# Patient Record
(demographics unavailable — no encounter records)

---

## 2017-08-02 NOTE — EMERGENCY ROOM REPORT
History of Present Illness


General


Chief Complaint:  Animal Bite


Source:  Patient





Present Illness


HPI


The patient is a 34-year-old female presenting with right arm rash and pain.  

She was seen in this emergency department earlier this morning for the same 

complaints.  She states that the pain and the redness of worsen.  It is now a 9/

10 dull ache and does not radiate from the right arm.  Worse with touch.  She 

denies other symptoms including fever or chills


Allergies:  


Coded Allergies:  


     No Known Allergies (Unverified , 4/10/13)





Patient History


Past Medical History:  see triage record


Pertinent Family History:  none


Last Menstrual Period:  Unk


Reviewed Nursing Documentation:  PMH: Agreed, PSxH: Agreed





Nursing Documentation-PMH


Past Medical History:  No Stated History





Review of Systems


All Other Systems:  negative except mentioned in HPI





Physical Exam





Vital Signs








  Date Time  Temp Pulse Resp B/P Pulse Ox O2 Delivery O2 Flow Rate FiO2


 


8/2/17 12:52 98.4 96 18 110/28 99 Room Air  








Sp02 EP Interpretation:  reviewed, normal


General Appearance:  no apparent distress, alert, GCS 15, non-toxic


Head:  normocephalic, atraumatic


ENT:  hearing grossly normal, normal pharynx, no angioedema, normal voice


Neck:  full range of motion, supple/symm/no masses


Musculoskeletal:  back normal, gait/station normal, normal range of motion


Neurologic:  alert, oriented x3, responsive, motor strength/tone normal, 

sensory intact, speech normal


Psychiatric:  judgement/insight normal, memory normal, mood/affect normal, no 

suicidal/homicidal ideation


Skin:  rash - Diffuse erythema to the right anterior proximal arm with central 

small punctures.


Lymphatic:  no adenopathy





Medical Decision Making


PA Attestation


Dr. Cuba is my supervising physician. Patient management was discussed with 

my supervising physician


Diagnostic Impression:  


 Primary Impression:  


 Cellulitis


 Qualified Codes:  L03.113 - Cellulitis of right upper limb


ER Course


The patient is a 34-year-old female presenting with a rash.





Ddx considered include but not limited to insect bite, contact dermatitis, 

eczema, cellulitis





Physical exam: Afebrile.


Right proximal anterior arm: There is erythema, tenderness to palpation, and 

increased temperature to the skin.





The patient will be treated for cellulitis





ER precautions given





Last Vital Signs








  Date Time  Temp Pulse Resp B/P Pulse Ox O2 Delivery O2 Flow Rate FiO2


 


8/2/17 12:52 98.4 96 18 110/28 99 Room Air  








Status:  improved


Disposition:  HOME, SELF-CARE


Condition:  Improved


Scripts


Doxycycline Hyclate* (VIBRAMYCIN*) 100 Mg Capsule


100 MG ORAL EVERY 12 HOURS, #14 CAP 0 Refills


   Prov: CORRY GARCIA         8/2/17


Referrals:  


[facility] (PCP)


Patient Instructions:  Cellulitis





Additional Instructions:  


I discussed my findings with the patient. All questions and concerns have been 

answered. Treatment and medication compliance have been addressed. I advised 

the patient that they need to follow up with PMD in 3-5 days. Return to ED if 

symptoms worsen, new symptoms arise, or if needed for any reason. Patient 

verbalized understanding of discharge instructions.











CORRY GARCIA Aug 2, 2017 22:08

## 2017-08-02 NOTE — EMERGENCY ROOM REPORT
History of Present Illness


General


Chief Complaint:  Animal Bite


Source:  Patient





Present Illness


HPI


Patient states that she notes some insect bites on her right arm and the back 

of her right shoulder.  She states that she was at a friend's house he was 

breathing her he her for some time.  She did sit on her bed.  However while she 

was there her friend asked her if she was "itching."  [place]  She did sleep over at her friend's house.  She did not see 

any mosquitoes or spiders.  The location is only on her right upper arm and 

back of her right shoulder.  She states the lesions itch a condom.  She has 

been applying hydrocortisone to them.  She states she has felt a little 

nauseated.  She denies fever or chills.  She has no other complaints.


Allergies:  


Coded Allergies:  


     No Known Allergies (Unverified , 4/10/13)





Patient History


Past Medical History:  none


Past Surgical History:  none


Social History:  Denies: alcohol use, drug use, smoking


Last Menstrual Period:  unk


Pregnant Now:  No


Reviewed Nursing Documentation:  PMH: Agreed, PSxH: Agreed





Nursing Documentation-PMH


Past Medical History:  No Stated History





Review of Systems


All Other Systems:  negative except mentioned in HPI





Physical Exam





Vital Signs








  Date Time  Temp Pulse Resp B/P Pulse Ox O2 Delivery O2 Flow Rate FiO2


 


8/2/17 07:33 97.9 87 20 124/70 99 Room Air  








Sp02 EP Interpretation:  reviewed, normal


General Appearance:  no apparent distress, alert, GCS 15, non-toxic


Head:  normocephalic, atraumatic


Eyes:  bilateral eye PERRL, bilateral eye normal inspection


ENT:  hearing grossly normal, normal pharynx, no angioedema, normal voice


Neck:  full range of motion, supple/symm/no masses


Respiratory:  no respiratory distress, no retraction, no accessory muscle use, 

speaking full sentences


Rectal:  deferred


Musculoskeletal:  gait/station normal, normal range of motion


Neurologic:  alert, oriented x3, responsive, motor strength/tone normal, 

sensory intact, speech normal


Psychiatric:  judgement/insight normal, memory normal, mood/affect normal, no 

suicidal/homicidal ideation


Skin:  warm/dry, well hydrated, other - Large erythematous raised area on R. 

medial upper arm 14cm oval.  Scattered dime sized lesions with center 

punctation on R. lateral and posterior shoulder.





Medical Decision Making


Diagnostic Impression:  


 Primary Impression:  


 Insect bites


ER Course


The patient has findings on physical exam consistent with insect bites.  These 

could be mosquito versus spider bite.  There is a large local allergic reaction 

to one of the bites on the right anterior upper arm.  This does not appear to 

be a cellulitis and more of a local allergic reaction.  The bites are itchy.  

There are no concerning findings on exam that would make for a venomous or 

serious insect bite.  The patient is instructed to obtain over-the-counter 

Benadryl and apply topical hydrocortisone cream for relief.  There is no 

evidence of anaphylaxis or serious allergic reaction.  The patient's instructed 

to followup closely with her primary care physician.





Last Vital Signs








  Date Time  Temp Pulse Resp B/P Pulse Ox O2 Delivery O2 Flow Rate FiO2


 


8/2/17 07:52 98.1 84 19 127/74 99 Room Air  








Disposition:  HOME, SELF-CARE


Condition:  Improved


Referrals:  


[facility] (PCP)











JCARLOS JACKSON D.O. Aug 2, 2017 08:08

## 2017-09-28 NOTE — EMERGENCY ROOM REPORT
History of Present Illness


General


Chief Complaint:  Headache


Source:  Patient





Present Illness


HPI


Patient is a 34-year-old female who presented after increased headache.  

Patient reported having the patient reports having gradual onset of headache 

after recent illness.  She reports having multiple episodes of vomiting as well 

as watery diarrhea .  She states she had been having some blurring her vision.  

She denies any recent head trauma.  She denies neck stiffness.


Allergies:  


Coded Allergies:  


     No Known Allergies (Unverified , 4/10/13)





Patient History


Past Medical History:  see triage record


Last Menstrual Period:  unk


Reviewed Nursing Documentation:  PMH: Agreed, PSxH: Agreed





Nursing Documentation-PMH


Past Medical History:  No Stated History





Review of Systems


All Other Systems:  negative except mentioned in HPI





Physical Exam





Vital Signs








  Date Time  Temp Pulse Resp B/P (MAP) Pulse Ox O2 Delivery O2 Flow Rate FiO2


 


9/28/17 19:33 98.1 90 16 110/65 96 Room Air  








General Appearance:  well appearing, no apparent distress, alert, GCS 15


Head:  normocephalic, atraumatic


ENT:  hearing grossly normal, normal voice


Neck:  full range of motion, supple


Respiratory:  no respiratory distress, speaking full sentences


Musculoskeletal:  no calf tenderness


Neurologic:  normal gait


Psychiatric:  mood/affect normal


Skin:  no rash





Medical Decision Making


Diagnostic Impression:  


 Primary Impression:  


 Gastroenteritis


ER Course


Patient presented for headache.  Differential diagnoses included but was not 

limited to skull fracture, subarachnoid hemorrhage, meningitis, aneurysm, mass 

lesion, intracranial hemorrhage.


The patient declined laboratory testing.  As she also declined IV fluids  the 

patient appears to be awake and alert and capable of self care.  She was 

advised to remain off of work for 3 days.  Patient is advised not to handle 

food until cleared by her physician.





Last Vital Signs








  Date Time  Temp Pulse Resp B/P (MAP) Pulse Ox O2 Delivery O2 Flow Rate FiO2


 


9/28/17 20:15 98.1 80 12 107/62 100 Room Air  








Status:  improved


Disposition:  HOME, SELF-CARE


Condition:  Stable


Departure Forms:  Return to Work   Return to Work in (Days):  4


Patient Instructions:  Dehydration, Adult





Additional Instructions:  


Return for increased dizziness, persistent vomitting or change in skin color











Aidan Montaño Sep 28, 2017 23:03

## 2018-05-17 NOTE — EMERGENCY ROOM REPORT
History of Present Illness


General


Chief Complaint:  Skin Rash/Abscess


Source:  Patient





Present Illness


HPI


35-year-old female patient presents ER complaining of bug bite on her left 

lower back.  Patient reports that she woke up this morning and thought she was 

bit by a "spider", states she did not see a spider or any other bug.  Denies 

fever, chest pain, shortness breath, vomiting.  Reports that she applied some 

topical antibiotic for pain and itching symptoms, states that pain and itching 

has been relieved since that time.


Allergies:  


Coded Allergies:  


     No Known Allergies (Unverified , 4/10/13)





Patient History


Past Medical History:  see triage record


Last Menstrual Period:  Depo shot


Pregnant Now:  No


Reviewed Nursing Documentation:  PMH: Agreed; PSxH: Agreed





Nursing Documentation-PMH


Past Medical History:  No Stated History





Review of Systems


All Other Systems:  negative except mentioned in HPI





Physical Exam





Vital Signs








  Date Time  Temp Pulse Resp B/P (MAP) Pulse Ox O2 Delivery O2 Flow Rate FiO2


 


5/17/18 11:38 98.3 67 19 119/76 96 Room Air  





 98.2       








Sp02 EP Interpretation:  reviewed, normal


General Appearance:  well appearing, no apparent distress, alert, GCS 15, non-

toxic


Head:  normocephalic, atraumatic


ENT:  hearing grossly normal, normal pharynx, no angioedema, normal voice, 

uvula midline, moist mucus membranes


Neck:  full range of motion


Respiratory:  lungs clear, normal breath sounds, no rhonchi, no respiratory 

distress, no accessory muscle use, no wheezing, speaking full sentences


Cardiovascular #1:  regular rate, rhythm, no edema


Musculoskeletal:  back normal, digits/nails normal, gait/station normal, normal 

range of motion, non-tender


Neurologic:  alert, oriented x3, responsive, motor strength/tone normal, 

sensory intact


Psychiatric:  mood/affect normal


Skin:  other - left lower lumbar spine: 3-4cm circular area of edema, likely 

urticarial, mild elevation, erythema, no TTP, no central clearing, no satellite 

lesion, no pus, no bleeding, no drainage, no vesicles, no blisters





Medical Decision Making


PA Attestation


Dr. Mcdaniel is my supervising Physician whom patient management has been 

discussed with.


Diagnostic Impression:  


 Primary Impression:  


 Bug bite


ER Course


Pt. presents to the ED c/o bug bite.





Ddx considered but are not limited to atopic dermatitis, bug bite, urticaria, 

allergic reaction.





Vital signs: are WNL, pt. is afebrile





ER COURSE:


Patient declined Benadryl or other medications in ER at this time.


Instructed to take Tylenol for pain symptoms


Return to ER for new or worsening of symptoms including but not limited to fever

, chest pain, shortness of breath, intractable vomiting, red streaking, 

worsening of lesion.


Patient is afebrile, no difficulty breathing or vomiting, did not believe 

patient requires antibiotics or further ER treatment at this time.


Follow-up with PCP for further management and treatment as needed.


Patient instructed to apply warm compresses to affected area.





DISCHARGE: 


-Rx given for Benadryl for pruritus. SE may cause drowsiness.


-Rx given for hydrocortisone cream.





At this time pt. is stable for d/c to home. Patient resting comfortably, in no 

acute distress, nontoxic appearing.


Care plan and follow up instructions have been discussed with the patient prior 

to discharge. 


Patient provided with printed patient care instructions, and any necessary 

prescriptions. 


Patient instructed to follow-up with primary care provider in 3 - 5 days.


Patient questions asked and answered.


Patient reports understanding and agreement to treatment plan.


ER precautions given. Patient instructed to return to ER immediately for any 

new or worsening of symptoms including but not limited to increasing SOB, 

persistent fever.





- Please note that this Emergency Department Report was dictated using [employer] technology software, occasionally this can lead to 

erroneous entry secondary to interpretation by the dictation equipment.





Last Vital Signs








  Date Time  Temp Pulse Resp B/P (MAP) Pulse Ox O2 Delivery O2 Flow Rate FiO2


 


5/17/18 12:03 98.2 67 19 119/76 96 Room Air  





 98.2       








Disposition:  HOME, SELF-CARE


Condition:  Stable


Scripts


Diphenhydramine Hcl* (BENADRYL*) 25 Mg Capsule


25 MG ORAL Q6H PRN for Itching, #30 CAP


   Prov: Ray Simmons         5/17/18 


Hydrocortisone 2% Cream (ANTI-ITCH 2% CREAM) Y Cr


28 GM TP BID for 7 Days, GM


   Prov: Ray Simmons         5/17/18


Patient Instructions:  Insect Bite, Easy-to-Read





Additional Instructions:  


Followup with primary care provider in 3 -5 days for further diagnosis and 

treatment.


Take medications as directed. 


Take Tylenol for pain symptoms.


Patient questions asked and answered.


ER precautions given, patient instructed to return to ER immediately for any 

new or worsening of symptoms including but not limited to fever, chest pain, 

shortness of breath, intractable vomiting, red streaking, worsening of lesion.











Ray Simmons May 17, 2018 12:27

## 2018-07-21 NOTE — EMERGENCY ROOM REPORT
History of Present Illness


General


Chief Complaint:  Skin Rash/Abscess


Source:  Patient





Present Illness


HPI


Patient present with complaints of redness and discomfort to the right ankle 

area


Patient was recently hospitalized with possible cellulitis





Patient reports that she was told by one physician that it appeared she had 

insect bites





Patient was discharged home recently with antibiotics


Presents now with complaint of new red area on the right ankle region


Patient reports increased discomfort to that area





Denies any fevers or chills denies any neck pain or photophobia


Allergies:  


Coded Allergies:  


     No Known Allergies (Unverified , 4/10/13)





Patient History


Past Medical History:  see triage record


Pertinent Family History:  none


Last Menstrual Period:  UNK


Reviewed Nursing Documentation:  PMH: Agreed; PSxH: Agreed





Nursing Documentation-PMH


Past Medical History:  No Stated History





Review of Systems


All Other Systems:  negative except mentioned in HPI





Physical Exam





Vital Signs








  Date Time  Temp Pulse Resp B/P (MAP) Pulse Ox O2 Delivery O2 Flow Rate FiO2


 


7/19/18 20:34 99.3 101 16 117/77 96 Room Air  





 99.3       








Sp02 EP Interpretation:  reviewed, normal


General Appearance:  well appearing, no apparent distress


Head:  normocephalic, atraumatic


Eyes:  bilateral eye PERRL, bilateral eye EOMI


ENT:  hearing grossly normal, normal pharynx


Neck:  full range of motion, supple


Respiratory:  lungs clear


Cardiovascular #1:  regular rate, rhythm, no edema


Gastrointestinal:  non tender, soft


Musculoskeletal:  normal inspection


Neurologic:  alert, oriented x3


Skin:  other - Area of erythema involving the right ankle region, approximately 

4 cm in length and it does appear to be circumferential around the ankle area, 

no fluctuance no flaring no dermatomal fashion spread no blister formation


Lymphatic:  no adenopathy





Medical Decision Making


Diagnostic Impression:  


 Primary Impression:  


 Rash and other nonspecific skin eruption


ER Course


Patient has a nonspecific dermatitis over the right ankle area





Given the previous history that she had similar areas over the left thigh and 

the right leg





Other differentials such as systemic pathology need to be entertained such as 

lupus and other autoimmune disease





Patient was placed on steroids initially she will continue on her antibiotics 

and requires close follow-up





Last Vital Signs








  Date Time  Temp Pulse Resp B/P (MAP) Pulse Ox O2 Delivery O2 Flow Rate FiO2


 


7/19/18 21:11 99.3  16 117/77 96 Room Air  





 99.3       


 


7/19/18 20:34  101      








Status:  improved


Disposition:  HOME, SELF-CARE


Condition:  Stable


Scripts


Diphenhydramine Hcl* (BENADRYL*) 25 Mg Capsule


25 MG ORAL Q6H PRN for Itching, #20 CAP


   Prov: Renato Mcdaniel DO         7/19/18 


Prednisone* (PREDNISONE*) 20 Mg Tablet


20 MG ORAL BID, #8 TAB


   Prov: Renato Mcdaniel DO         7/19/18


Referrals:  


[facility],REFERRING (PCP)


Patient Instructions:  Rash





Additional Instructions:  


Patient is provided with the discharge instructions notified to follow up with 

primary doctor in the next 2-3 days otherwise return to the er with any 

worsening symptoms.


Please note that this report is being documented using [employer] technology.  This 

can lead to erroneous entry secondary to incorrect interpretation by the 

dictating instrument.











Renato Mcdaniel DO Jul 21, 2018 01:13

## 2024-04-30 NOTE — EMERGENCY ROOM REPORT
History of Present Illness


General


Chief Complaint:  Abdominal Pain


Source:  Patient





Present Illness


HPI


35-year-old female presents to the emergency department complaining of 6/10 in 

severity lower abdominal cramping with bloating sensation since this a.m.  

Patient reports 3 episodes of vomiting and diarrhea yesterday after eating 

gumbo.  Patient denies fevers or chills she denies pregnancy and states that 

she is currently taking Depo-Provera.  She denies blood in the vomit or stool 

she denies dark tarry stools.  Patient denies abdominal tenderness.  Dysuria, 

hematuria or frequency.  Patient describes her symptoms as uncomfortable.  She 

reports continued nausea. Denies head trauma, fall or imbalance.  Denies CP, 

Palpitations, LOC, AMS, dizziness, Changes in Vision, Sensation, paresthesias, 

or a sudden severe headache.


Allergies:  


Coded Allergies:  


     No Known Allergies (Unverified , 4/10/13)





Patient History


Past Medical History:  see triage record


Past Surgical History:  none


Immunizations:  UTD


Reviewed Nursing Documentation:  PMH: Agreed, PSxH: Agreed





Nursing Documentation-PMH


Past Medical History:  No Stated History





Review of Systems


All Other Systems:  negative except mentioned in HPI





Physical Exam





Vital Signs








  Date Time  Temp Pulse Resp B/P (MAP) Pulse Ox O2 Delivery O2 Flow Rate FiO2


 


12/20/17 18:41 98.6  16 123/84 100 Room Air  


 


12/20/17 18:41  75      








Sp02 EP Interpretation:  reviewed, normal


General Appearance:  no apparent distress, alert, GCS 15, non-toxic


Head:  normocephalic, atraumatic


Eyes:  bilateral eye normal inspection, bilateral eye PERRL


ENT:  hearing grossly normal, normal pharynx, no angioedema, normal voice


Neck:  full range of motion


Respiratory:  lungs clear, normal breath sounds, speaking full sentences


Cardiovascular #1:  regular rate, rhythm


Gastrointestinal:  normal bowel sounds, non tender, soft, no guarding, no 

rebound, other - Negative [place] signs, Negative MacBurney's sign, Negative 

Rosvigns Sign, Negative Psoas, No Peritoneal signs.


Rectal:  deferred


Genitourinary:  normal inspection, no CVA tenderness


Musculoskeletal:  back normal, gait/station normal, normal range of motion, non-

tender, no calf tenderness


Neurologic:  alert, oriented x3, responsive, motor strength/tone normal, 

sensory intact, normal gait, speech normal


Skin:  normal color, no rash, warm/dry, well hydrated





Medical Decision Making


PA Attestation


Dr. felix is my supervising Physician whom patient management has been 

discussed with.


Diagnostic Impression:  


 Primary Impression:  


 Nausea and vomiting


 Qualified Codes:  R11.2 - Nausea with vomiting, unspecified


ER Course


35-year-old female presents to the emergency department complaining of 6/10 in 

severity lower abdominal cramping with bloating sensation since this a.m.  

Patient reports 3 episodes of vomiting and diarrhea yesterday after eating 

gumbo.  Patient denies fevers or chills she denies pregnancy and states that 

she is currently taking Depo-Provera.  She denies blood in the vomit or stool 

she denies dark tarry stools.  Patient denies abdominal tenderness.  Dysuria, 

hematuria or frequency.  Patient describes her symptoms as uncomfortable.  She 

reports continued nausea. Denies head trauma, fall or imbalance.  Denies CP, 

Palpitations, LOC, AMS, dizziness, Changes in Vision, Sensation, paresthesias, 

or a sudden severe headache.





Ddx considered but are not limited to GE, colitis, acute appy, SBO, Cyclical 

Vomiting secondary to THC,  * Pregnancy





Vital signs:  pt. is afebrile,   NAD, non-toxic in appearance.


H&PE are most consistent with  GE  no evidence to suggest acute abdomen on 

physical exam.





ORDERS: 





-None required at this time, the dx is clinical. 





-Urine Hcg:Negative


-UA: unremarkable








ED INTERVENTIONS: 


-Bentyl


-mylanta


-Zofran 4mg


-Pt. able to tolerate oral fluids in ED. 





d/w pt. conservative treatment, and to follow up with a primary care provider. 

pt given a list of primary care clinics for follow up. d/w pt. to return to the 

ED with worsening or new symptoms.





DISCHARGE: At this time pt. is stable for d/c to home. Will provide printed 

patient care instructions, and any necessary prescriptions. Care plan and 

follow up instructions have been discussed with the patient prior to discharge.





Labs








Test


  12/20/17


19:00


 


Urine Color Yellow 


 


Urine Appearance


  Slightly


cloudy


 


Urine pH 5 (4.5-8.0) 


 


Urine Specific Gravity


  1.020


(1.005-1.035)


 


Urine Protein 1+ (NEGATIVE) 


 


Urine Glucose (UA)


  Negative


(NEGATIVE)


 


Urine Ketones


  Negative


(NEGATIVE)


 


Urine Occult Blood 3+ (NEGATIVE) 


 


Urine Nitrite


  Negative


(NEGATIVE)


 


Urine Bilirubin


  Negative


(NEGATIVE)


 


Urine Urobilinogen


  1 MG/DL


(0.0-1.0)


 


Urine Leukocyte Esterase 1+ (NEGATIVE) 


 


Urine RBC


  5-10 /HPF (0 -


2)


 


Urine WBC


  0-2 /HPF (0 -


2)


 


Urine Squamous Epithelial


Cells Moderate /LPF


(NONE/OCC)


 


Urine Bacteria


  Few /HPF


(NONE)


 


Urine HCG, Qualitative Negative 











Last Vital Signs








  Date Time  Temp Pulse Resp B/P (MAP) Pulse Ox O2 Delivery O2 Flow Rate FiO2


 


12/20/17 18:41 98.6 75 16 123/84 100 Room Air  








Disposition:  HOME, SELF-CARE


Condition:  Stable


Scripts


Dicyclomine Hcl* (BENTYL*) 10 Mg Capsule


10 MG ORAL FOUR TIMES A DAY, #20 CAP


   Prov: Sarah Ron         12/20/17 


Ondansetron Odt* (ZOFRAN ODT*) 4 Mg Tab.rapdis


4 MG ORAL Q6H Y for Nausea & Vomiting, #20 TAB


   Prov: Sarah Ron         12/20/17


Referrals:  


EMPLOYEE [facility] [employer],REFERRIN (PCP)


Departure Forms:  Return to Work      Return to Work Date:  Dec 21, 2017


   Work Restrictions:  None


   Other Restrictions:  Please excuse 12/19/17 day of symptoms onset. 


   Return to Full Activity:  Dec 21, 2017


Patient Instructions:  Nausea and Vomiting, Adult





Additional Instructions:  


Take medications as directed. 


 ** Follow up with a Primary Care Provider in 3-5 days, even if your symptoms 

have resolved. ** 


--Please review list of primary care clinics, if you do not already have a 

primary care provider





Return sooner to ED if new symptoms occur, or current symptoms become worse. 








- Please note that this Emergency Department Report was dictated using [employer] technology software, occasionally this can lead to 

erroneous entry secondary to interpretation by the dictation equipment.











Sarah Ron Dec 20, 2017 19:51
100